# Patient Record
Sex: FEMALE | Race: WHITE | NOT HISPANIC OR LATINO | URBAN - METROPOLITAN AREA
[De-identification: names, ages, dates, MRNs, and addresses within clinical notes are randomized per-mention and may not be internally consistent; named-entity substitution may affect disease eponyms.]

---

## 2024-10-28 ENCOUNTER — AMBULATORY SURGICAL CENTER (OUTPATIENT)
Dept: URBAN - METROPOLITAN AREA SURGERY 3 | Facility: SURGERY | Age: 84
End: 2024-10-28
Payer: MEDICARE

## 2024-10-28 VITALS
DIASTOLIC BLOOD PRESSURE: 69 MMHG | WEIGHT: 178 LBS | RESPIRATION RATE: 12 BRPM | SYSTOLIC BLOOD PRESSURE: 132 MMHG | RESPIRATION RATE: 20 BRPM | HEART RATE: 88 BPM | SYSTOLIC BLOOD PRESSURE: 127 MMHG | HEART RATE: 87 BPM | SYSTOLIC BLOOD PRESSURE: 133 MMHG | SYSTOLIC BLOOD PRESSURE: 132 MMHG | OXYGEN SATURATION: 98 % | HEART RATE: 106 BPM | TEMPERATURE: 98.3 F | DIASTOLIC BLOOD PRESSURE: 79 MMHG | DIASTOLIC BLOOD PRESSURE: 69 MMHG | DIASTOLIC BLOOD PRESSURE: 75 MMHG | RESPIRATION RATE: 20 BRPM | DIASTOLIC BLOOD PRESSURE: 71 MMHG | HEART RATE: 87 BPM | OXYGEN SATURATION: 95 % | RESPIRATION RATE: 14 BRPM | DIASTOLIC BLOOD PRESSURE: 75 MMHG | TEMPERATURE: 98.3 F | DIASTOLIC BLOOD PRESSURE: 87 MMHG | SYSTOLIC BLOOD PRESSURE: 170 MMHG | DIASTOLIC BLOOD PRESSURE: 87 MMHG | HEART RATE: 95 BPM | OXYGEN SATURATION: 99 % | DIASTOLIC BLOOD PRESSURE: 79 MMHG | TEMPERATURE: 98.6 F | WEIGHT: 178 LBS | HEART RATE: 95 BPM | TEMPERATURE: 98.6 F | OXYGEN SATURATION: 96 % | OXYGEN SATURATION: 95 % | RESPIRATION RATE: 18 BRPM | RESPIRATION RATE: 14 BRPM | OXYGEN SATURATION: 99 % | SYSTOLIC BLOOD PRESSURE: 134 MMHG | HEIGHT: 66 IN | OXYGEN SATURATION: 98 % | SYSTOLIC BLOOD PRESSURE: 133 MMHG | SYSTOLIC BLOOD PRESSURE: 127 MMHG | OXYGEN SATURATION: 96 % | HEART RATE: 88 BPM | RESPIRATION RATE: 18 BRPM | HEART RATE: 106 BPM | HEART RATE: 84 BPM | SYSTOLIC BLOOD PRESSURE: 170 MMHG | RESPIRATION RATE: 12 BRPM | DIASTOLIC BLOOD PRESSURE: 71 MMHG | SYSTOLIC BLOOD PRESSURE: 134 MMHG | HEART RATE: 84 BPM | HEIGHT: 66 IN

## 2024-10-28 DIAGNOSIS — K52.831 COLLAGENOUS COLITIS: ICD-10-CM

## 2024-10-28 DIAGNOSIS — K57.30 DIVERTICULOSIS OF LARGE INTESTINE WITHOUT PERFORATION OR ABS: ICD-10-CM

## 2024-10-28 PROCEDURE — 45380 COLONOSCOPY AND BIOPSY: CPT | Performed by: STUDENT IN AN ORGANIZED HEALTH CARE EDUCATION/TRAINING PROGRAM

## 2024-10-30 LAB
GASTRO ONE PATHOLOGY: PDF REPORT: (no result)
GASTRO ONE PATHOLOGY: PDF REPORT: (no result)

## 2025-01-08 ENCOUNTER — OFFICE (OUTPATIENT)
Dept: URBAN - METROPOLITAN AREA CLINIC 11 | Facility: CLINIC | Age: 85
End: 2025-01-08
Payer: COMMERCIAL

## 2025-01-08 VITALS
HEIGHT: 66 IN | DIASTOLIC BLOOD PRESSURE: 92 MMHG | HEART RATE: 84 BPM | OXYGEN SATURATION: 100 % | WEIGHT: 185 LBS | SYSTOLIC BLOOD PRESSURE: 197 MMHG

## 2025-01-08 DIAGNOSIS — R19.7 DIARRHEA, UNSPECIFIED: ICD-10-CM

## 2025-01-08 DIAGNOSIS — K21.9 GASTRO-ESOPHAGEAL REFLUX DISEASE WITHOUT ESOPHAGITIS: ICD-10-CM

## 2025-01-08 PROCEDURE — 99214 OFFICE O/P EST MOD 30 MIN: CPT | Performed by: STUDENT IN AN ORGANIZED HEALTH CARE EDUCATION/TRAINING PROGRAM

## 2025-01-08 NOTE — SERVICENOTES
patient had collagenous colitis.  She is now off simvastatin and symptoms have resolved on budesonide.  So I am going to taper her off the budesonide and see how her symptoms do.  If her symptoms return then we might consider changing her omeprazole to famotidine in the future.  She just gets this over-the-counter currently.  And we can always go back on the budesonide if needed.  Return to clinic in 6 months or sooner if necessary.  All questions addressed.

## 2025-01-08 NOTE — SERVICEHPINOTES
Tova Young is a 84 year old  White female wiith a [facility] significant for arthritis, diabetes, glaucoma, hypertension, hyperlipidemia and osteoporosis who initially presented to [facility] for evaluation of diarrhea. Clinic Visit 9/20/2024:brThe patient presents with diarrhea that began on July 5th. She reports having 5-6 bowel movements daily, a significant increase from her baseline of one bowel movement daily. She denies any visible blood or black tarry stools. She was diagnosed with a UTI in August and was prescribed Macrobid. She denies any antibiotic use prior to the onset of diarrhea. Recent stool studies have been negative for any infections. She also denies abdominal pain, but reports occasional bloating. She is currently on omeprazole 20mg daily for acid reflux, which is well controlled. She denies any difficulty swallowing, nausea, or vomiting. She also reports taking Imodium for the diarrhea and has started a probiotic and eating yogurt. She has a history of diverticulosis, diagnosed during a colonoscopy in 2007. She has not had a colonoscopy since then.  she denies NSAID use.   she does not smoke and very rarely drinks a glass of wine.  Her most recent stress test was negative and she is not on any anticoagulants.Colonoscopy 10/28/24:brPSH includes tubal ligation and appendectomy, no FHx of colon cancers, having diarrhea for few months, not on blood thinners, last BM was clear. 
br
br    clinic visit 01/08/2025: 
br Colonoscopy was positive for collagenous colitis and patient was prescribed budesonide. recommended changing the simvastatin to another statin.  she reports that she is no longer on a statin and she is following up with her primary care provider in February to see if she needs to be on an alternative for cholesterol medication.  She is not taking any NSAIDs.  She has been taking the budesonide 3 pills a day since her colonoscopy and reports that her diarrhea is completely resolved.  She does take omeprazole 20 mg once a day for acid reflux and has been on this for many years.  She has about 1-2 bowel movements per day and feels great.

## 2025-07-03 ENCOUNTER — OFFICE (OUTPATIENT)
Dept: URBAN - METROPOLITAN AREA CLINIC 9 | Facility: CLINIC | Age: 85
End: 2025-07-03
Payer: MEDICARE

## 2025-07-03 VITALS
HEIGHT: 66 IN | SYSTOLIC BLOOD PRESSURE: 157 MMHG | DIASTOLIC BLOOD PRESSURE: 68 MMHG | HEART RATE: 70 BPM | WEIGHT: 175 LBS

## 2025-07-03 DIAGNOSIS — K21.9 GASTRO-ESOPHAGEAL REFLUX DISEASE WITHOUT ESOPHAGITIS: ICD-10-CM

## 2025-07-03 DIAGNOSIS — R19.7 DIARRHEA, UNSPECIFIED: ICD-10-CM

## 2025-07-03 DIAGNOSIS — R32 UNSPECIFIED URINARY INCONTINENCE: ICD-10-CM

## 2025-07-03 DIAGNOSIS — R15.9 FULL INCONTINENCE OF FECES: ICD-10-CM

## 2025-07-03 PROCEDURE — 99214 OFFICE O/P EST MOD 30 MIN: CPT | Performed by: STUDENT IN AN ORGANIZED HEALTH CARE EDUCATION/TRAINING PROGRAM

## 2025-07-03 RX ORDER — BUDESONIDE 3 MG/1
CAPSULE ORAL
Qty: 90 | Refills: 1 | Status: ACTIVE
Start: 2025-07-03

## 2025-07-03 RX ORDER — FAMOTIDINE 40 MG/1
TABLET, FILM COATED ORAL
Qty: 90 | Refills: 2 | Status: ACTIVE
Start: 2025-07-03

## 2025-07-03 NOTE — SERVICENOTES
going to stop the patient's omeprazole and switch her to famotidine to try to  limit the amount of offending agents we have for microscopic colitis.  Counseled her on continuing to avoid NSAIDs.  She has urinary and fecal incontinence on occasion so I think we need to refer her for pelvic floor physical therapy and see if this helps.  I am also going to give her a prescription for budesonide that she can use when she has an acute flare.  I told her that she can use Imodium as needed for breakthrough symptoms as well.  If she continues to have worsening symptoms despite pelvic floor physical therapy and another budesonide taper then she may need to be put on 3 mg daily of budesonide to prevent flares.  Discussed this with her daughter who is with her today and she agrees with this plan.

## 2025-07-03 NOTE — SERVICEHPINOTES
Tova Young is a 84 year old White female wiith a [facility] significant for arthritis, diabetes, glaucoma, hypertension, hyperlipidemia and osteoporosis who initially presented to [facility] for evaluation of diarrhea. Clinic Visit 9/20/2024:brThe patient presents with diarrhea that began on July 5th. She reports having 5-6 bowel movements daily, a significant increase from her baseline of one bowel movement daily. She denies any visible blood or black tarry stools. She was diagnosed with a UTI in August and was prescribed Macrobid. She denies any antibiotic use prior to the onset of diarrhea. Recent stool studies have been negative for any infections. She also denies abdominal pain, but reports occasional bloating. She is currently on omeprazole 20mg daily for acid reflux, which is well controlled. She denies any difficulty swallowing, nausea, or vomiting. She also reports taking Imodium for the diarrhea and has started a probiotic and eating yogurt. She has a history of diverticulosis, diagnosed during a colonoscopy in 2007. She has not had a colonoscopy since then.  she denies NSAID use.   she does not smoke and very rarely drinks a glass of wine.  Her most recent stress test was negative and she is not on any anticoagulants.Colonoscopy 10/28/24:brPSH includes tubal ligation and appendectomy, no FHx of colon cancers, having diarrhea for few months, not on blood thinners, last BM was clear. clinic visit 01/08/2025:brColonoscopy was positive for collagenous colitis and patient was prescribed budesonide. recommended changing the simvastatin to another statin.  she reports that she is no longer on a statin and she is following up with her primary care provider in February to see if she needs to be on an alternative for cholesterol medication.  She is not taking any NSAIDs.  She has been taking the budesonide 3 pills a day since her colonoscopy and reports that her diarrhea is completely resolved.  She does take omeprazole 20 mg once a day for acid reflux and has been on this for many years.  She has about 1-2 bowel movements per day and feels great. 
br
br    clinic visit 07/03/2025: 
br Patient reports that she is better whenever she is on the budesonide but she completed the taper and she has started having fecal incontinence episodes as well every once in awhile again.  She also has some urinary incontinence as well almost on a daily basis for which she has to wear depends of note.  She is not on any NSAIDs.  She was switched off of simvastatin.  She does take omeprazole over-the-counter 20 mg once daily still.  She is on hydrocodone half tab a couple of times a week.  She has never had pelvic floor physical therapy but she has a history of 3 vaginal births and she thinks she has had an episiotomy in the past as well.  She is here with her daughter today.